# Patient Record
Sex: FEMALE | Race: WHITE | NOT HISPANIC OR LATINO | URBAN - METROPOLITAN AREA
[De-identification: names, ages, dates, MRNs, and addresses within clinical notes are randomized per-mention and may not be internally consistent; named-entity substitution may affect disease eponyms.]

---

## 2023-08-02 ENCOUNTER — CONSULT (OUTPATIENT)
Dept: PLASTIC SURGERY | Facility: CLINIC | Age: 48
End: 2023-08-02
Payer: COMMERCIAL

## 2023-08-02 DIAGNOSIS — R22.32 ARM MASS, LEFT: Primary | ICD-10-CM

## 2023-08-02 PROCEDURE — 99214 OFFICE O/P EST MOD 30 MIN: CPT | Performed by: STUDENT IN AN ORGANIZED HEALTH CARE EDUCATION/TRAINING PROGRAM

## 2023-08-02 NOTE — PROGRESS NOTES
Assessment and Plan:  Ms. Kyung Vega is a 57 y.o. female presenting with recurrent left upper arm mass    We discussed the procedure, risks, benefits, alternatives and postoperative instructions and expectations. All patient's questions were answered and she voiced understanding. Booking form submitted. History of Present Illness:   Ms. Kyung Vega is a 57 y.o. female presenting with recurrent left upper arm mass. This was excised with a small incision but recurred shortly after. Denies infections or drainage. Review of Systems:  A 12 point ROS was performed and negative except per HPI. Past Medical History:  Past Medical History:   Diagnosis Date   • Environmental allergies    • Stomach problems        Past Surgical History:  Past Surgical History:   Procedure Laterality Date   • APPENDECTOMY     • HERNIA REPAIR  1998   • MENISCECTOMY     • SPINE SURGERY  1992    scoliosis amin rods   • WISDOM TOOTH EXTRACTION         Social History:  Social History     Tobacco Use   • Smoking status: Never   • Smokeless tobacco: Never   Vaping Use   • Vaping Use: Never used   Substance Use Topics   • Alcohol use: Yes     Comment: socially       Family History:  Family History   Problem Relation Age of Onset   • Heart disease Family    • Diabetes Family        Allergies:  No Known Allergies    Medications:  Current Outpatient Medications on File Prior to Visit   Medication Sig Dispense Refill   • cetirizine (ZyrTEC) 10 mg tablet Take 10 mg by mouth daily     • CITRUS BERGAMOT PO Take by mouth     • LYSINE PO Take by mouth     • spironolactone (ALDACTONE) 100 mg tablet Take 100 mg by mouth daily       No current facility-administered medications on file prior to visit. Physical Examination:  There were no vitals taken for this visit. Estimated body mass index is 22.12 kg/m² as calculated from the following:    Height as of 10/27/21: 5' 1.5" (1.562 m). Weight as of 10/27/21: 54 kg (119 lb).   General: NAD, well appearing, AAOx3  HEENT: NCAT, EOMI, MMM, supple  Resp: Nonlabored  Heart: RRR  Abdomen: Soft, ND, NT  Extremities/MSK: no LE edema, no obvious deficits in ROM  Neuro: grossly intact with no obvious deficits  Skin: left arm recurrent superficial mass, 0.7 cm embedded within dermis/superficial skin, no drainage, no punctum      Catarina Herron,   Plastic and Reconstructive Surgery

## 2023-09-28 ENCOUNTER — PROCEDURE VISIT (OUTPATIENT)
Dept: PLASTIC SURGERY | Facility: CLINIC | Age: 48
End: 2023-09-28
Payer: COMMERCIAL

## 2023-09-28 DIAGNOSIS — R22.32 ARM MASS, LEFT: Primary | ICD-10-CM

## 2023-09-28 PROCEDURE — 88304 TISSUE EXAM BY PATHOLOGIST: CPT | Performed by: PATHOLOGY

## 2023-09-28 PROCEDURE — 25076 EXC FOREARM TUM DEEP < 3 CM: CPT | Performed by: STUDENT IN AN ORGANIZED HEALTH CARE EDUCATION/TRAINING PROGRAM

## 2023-09-28 NOTE — PROGRESS NOTES
Mass Excision     Date/Time 9/28/2023 8:30 AM     Performed by  Ellouise Dandy, DO   Authorized by Ellouise Dandy, DO     Universal Protocol   Consent: Verbal consent obtained. Written consent obtained. Time out: Immediately prior to procedure a "time out" was called to verify the correct patient, procedure, equipment, support staff and site/side marked as required. Procedure Details   Procedure Notes: After informed consent, the area of planned excision was marked. Local anesthetic was infiltrated and allowed time to set. The patient was prepped and draped in usual sterile fashion. The mass was excised en bloc with the overlying adherent skin. The peripheral skin was undermined to provide a tension free closure. Hemostasis was obtained. The defect was then closed in layers using 4-0 monocryl for the deep dermis. The skin was approximated using 6-0 prolene. The area was cleansed. Bacitracin and a bandaid were applied. The instrument, sponge and needle count were correct and verified upon completion of the case. The patient tolerated the procedure well.

## 2023-10-03 PROCEDURE — 88304 TISSUE EXAM BY PATHOLOGIST: CPT | Performed by: PATHOLOGY

## 2023-10-10 ENCOUNTER — OFFICE VISIT (OUTPATIENT)
Dept: PLASTIC SURGERY | Facility: CLINIC | Age: 48
End: 2023-10-10

## 2023-10-10 DIAGNOSIS — R22.32 ARM MASS, LEFT: Primary | ICD-10-CM

## 2023-10-10 PROCEDURE — 99024 POSTOP FOLLOW-UP VISIT: CPT | Performed by: PHYSICIAN ASSISTANT

## 2023-10-10 NOTE — PROGRESS NOTES
Assessment/Plan:     Patient is a 29-year-old female post excision of a recurrent left upper arm mass by Dr. Lemuel Leblanc on 9/28/2023. Please see HPI. The patient returns to the office today for first postoperative visit and suture removal.  Intraoperative pathology was resulted as follows:    Final Diagnosis   A. Skin, left arm, excision:  Follicular cyst, infundibular type (epidermal inclusion cyst). Patient was encouraged to keep the area well moisturized. She will begin silicone scar treatment and avoid sun exposure. Per patient's request, she will follow-up in our office as needed. Diagnoses and all orders for this visit:    Arm mass, left          Subjective:     Patient ID: Joe Spear is a 57 y.o. female. HPI    Patient reports no issues or concerns. She has been doing well since her procedure. Review of Systems    See HPI     Objective:     Physical Exam      Incision and sutures are clean, dry and intact.